# Patient Record
Sex: MALE | Race: WHITE | URBAN - METROPOLITAN AREA
[De-identification: names, ages, dates, MRNs, and addresses within clinical notes are randomized per-mention and may not be internally consistent; named-entity substitution may affect disease eponyms.]

---

## 2019-01-09 NOTE — PROGRESS NOTES
"  SUBJECTIVE:   Bo Chowdary is a 28 year old male who presents to clinic today for the following health issues:    Musculoskeletal problem/pain      Duration: 4-5 days    Description  Location: back right side of neck, is constant    Intensity:  Moderate-severe    Accompanying signs and symptoms: feels dull and intense, pinching pain with some movements; sometimes radiation of pain to top of head    History  Previous similar problem: no   Previous evaluation:  none    Precipitating or alleviating factors:  Trauma or overuse: no   Aggravating factors include: certain movements of his head/neck    Therapies tried and outcome: movement and massage makes it worse; has taken ibuprofen during day and tylenol PM at night which helps    -Patient is a 27yo male who notes a 4-5 day hx of right sided neck pain  -the pain is constant; thinks it may be a \"pinched nerve\"  -when he turns his head to the right there will be a sharp pain  -also notes that when he stands up to fast he'll feel some of the pain  -anytime he moves his head around there is pain  -cannot recall any specific reason for the symptoms   -trying ibuprofen, tylenol to help but the pain remains    Problem list and histories reviewed & adjusted, as indicated.  Additional history: as documented    Patient Active Problem List   Diagnosis     Attention deficit disorder     CARDIOVASCULAR SCREENING; LDL GOAL LESS THAN 160     Past Surgical History:   Procedure Laterality Date     C NONSPECIFIC PROCEDURE  5/2012    Cyst/mass removal, R side of the face     MOUTH SURGERY  2009    wisdom teeth        Social History     Tobacco Use     Smoking status: Never Smoker     Smokeless tobacco: Never Used   Substance Use Topics     Alcohol use: Yes     Comment: rarely     Family History   Problem Relation Age of Onset     No Known Problems Mother      No Known Problems Father            Reviewed and updated as needed this visit by clinical staff  Tobacco  Allergies  Meds  " "Med Hx  Surg Hx  Fam Hx  Soc Hx      Reviewed and updated as needed this visit by Provider         ROS:  Constitutional, HEENT, cardiovascular, pulmonary, gi and gu systems are negative, except as otherwise noted.    OBJECTIVE:     /80 (BP Location: Right arm, Patient Position: Chair, Cuff Size: Adult Regular)   Pulse 92   Temp 98.2  F (36.8  C) (Oral)   Resp 16   Ht 1.88 m (6' 2\")   Wt 83.6 kg (184 lb 6.4 oz)   SpO2 96%   BMI 23.68 kg/m    Body mass index is 23.68 kg/m .  GENERAL: healthy, alert and no distress  MS: mild ttp along the right paracervical. Increased with right lateral flexion and right rotation. Spurling negative. No extremity weakness.    Diagnostic Test Results:  none     ASSESSMENT/PLAN:   1. Cervicalgia  Likely spasm. Recommend continuing otc supportive cares, heat and stretching. Call if not improving and will consider muscle relaxant.    2. Need for prophylactic vaccination and inoculation against influenza  - FLU VACCINE, SPLIT VIRUS, IM (QUADRIVALENT) [68464]- >3 YRS  - Vaccine Administration, Initial [45640]      Silas Olguin PA-C  [facility]  "

## 2019-01-10 ENCOUNTER — OFFICE VISIT (OUTPATIENT)
Dept: FAMILY MEDICINE | Facility: CLINIC | Age: 29
End: 2019-01-10
Payer: COMMERCIAL

## 2019-01-10 VITALS
WEIGHT: 184.4 LBS | HEIGHT: 74 IN | RESPIRATION RATE: 16 BRPM | OXYGEN SATURATION: 96 % | HEART RATE: 92 BPM | TEMPERATURE: 98.2 F | SYSTOLIC BLOOD PRESSURE: 124 MMHG | DIASTOLIC BLOOD PRESSURE: 80 MMHG | BODY MASS INDEX: 23.66 KG/M2

## 2019-01-10 DIAGNOSIS — M54.2 CERVICALGIA: Primary | ICD-10-CM

## 2019-01-10 DIAGNOSIS — Z23 NEED FOR PROPHYLACTIC VACCINATION AND INOCULATION AGAINST INFLUENZA: ICD-10-CM

## 2019-01-10 PROCEDURE — 99203 OFFICE O/P NEW LOW 30 MIN: CPT | Mod: 25 | Performed by: PHYSICIAN ASSISTANT

## 2019-01-10 PROCEDURE — 90471 IMMUNIZATION ADMIN: CPT | Performed by: PHYSICIAN ASSISTANT

## 2019-01-10 PROCEDURE — 90686 IIV4 VACC NO PRSV 0.5 ML IM: CPT | Performed by: PHYSICIAN ASSISTANT

## 2019-01-10 ASSESSMENT — MIFFLIN-ST. JEOR: SCORE: 1866.18

## 2019-01-10 NOTE — PROGRESS NOTES
Injectable Influenza Immunization Documentation    1.  Is the person to be vaccinated sick today?   No    2. Does the person to be vaccinated have an allergy to a component   of the vaccine?   No  Egg Allergy Algorithm Link    3. Has the person to be vaccinated ever had a serious reaction   to influenza vaccine in the past?   No    4. Has the person to be vaccinated ever had Guillain-Barré syndrome?   No    Form completed by Irma Thao CMA ([facility])

## 2019-01-10 NOTE — PATIENT INSTRUCTIONS
Start using 3 tab (600mg) of ibuprofen three times daily through the weekend.  Use head and massage.   Please call if not improved.

## 2020-02-16 ENCOUNTER — HEALTH MAINTENANCE LETTER (OUTPATIENT)
Age: 30
End: 2020-02-16

## 2020-11-22 ENCOUNTER — HEALTH MAINTENANCE LETTER (OUTPATIENT)
Age: 30
End: 2020-11-22

## 2021-04-04 ENCOUNTER — HEALTH MAINTENANCE LETTER (OUTPATIENT)
Age: 31
End: 2021-04-04

## 2021-09-19 ENCOUNTER — HEALTH MAINTENANCE LETTER (OUTPATIENT)
Age: 31
End: 2021-09-19

## 2022-04-30 ENCOUNTER — HEALTH MAINTENANCE LETTER (OUTPATIENT)
Age: 32
End: 2022-04-30

## 2022-11-20 ENCOUNTER — HEALTH MAINTENANCE LETTER (OUTPATIENT)
Age: 32
End: 2022-11-20

## 2023-06-02 ENCOUNTER — HEALTH MAINTENANCE LETTER (OUTPATIENT)
Age: 33
End: 2023-06-02